# Patient Record
Sex: FEMALE | Race: WHITE | NOT HISPANIC OR LATINO | Employment: UNEMPLOYED | URBAN - METROPOLITAN AREA
[De-identification: names, ages, dates, MRNs, and addresses within clinical notes are randomized per-mention and may not be internally consistent; named-entity substitution may affect disease eponyms.]

---

## 2020-06-24 NOTE — PATIENT DISCUSSION
Spoke with patient about lenses having a limit, and that tears, cornea, retina, nerve, and brain all are part of vision.

## 2021-06-08 ENCOUNTER — NEW PATIENT COMPREHENSIVE (OUTPATIENT)
Dept: URBAN - METROPOLITAN AREA CLINIC 43 | Facility: CLINIC | Age: 61
End: 2021-06-08

## 2021-06-08 DIAGNOSIS — H33.191: ICD-10-CM

## 2021-06-08 DIAGNOSIS — H33.192: ICD-10-CM

## 2021-06-08 PROCEDURE — 92015 DETERMINE REFRACTIVE STATE: CPT

## 2021-06-08 PROCEDURE — 92004 COMPRE OPH EXAM NEW PT 1/>: CPT

## 2021-06-08 ASSESSMENT — VISUAL ACUITY
OS_SC: 20/30
OD_CC: 20/20
OS_CC: 20/20
OS_CC: J1
OU_SC: 20/30
OU_CC: 20/20
OD_CC: J2
OD_SC: 20/40
OU_CC: J1

## 2021-06-08 ASSESSMENT — TONOMETRY
OS_IOP_MMHG: 17
OD_IOP_MMHG: 18

## 2021-06-15 ENCOUNTER — RETINA CONSULT (OUTPATIENT)
Dept: URBAN - METROPOLITAN AREA CLINIC 46 | Facility: CLINIC | Age: 61
End: 2021-06-15

## 2021-06-15 DIAGNOSIS — H35.033: ICD-10-CM

## 2021-06-15 DIAGNOSIS — H33.191: ICD-10-CM

## 2021-06-15 DIAGNOSIS — H33.192: ICD-10-CM

## 2021-06-15 PROCEDURE — 92250 FUNDUS PHOTOGRAPHY W/I&R: CPT

## 2021-06-15 PROCEDURE — 99214 OFFICE O/P EST MOD 30 MIN: CPT

## 2021-06-15 ASSESSMENT — TONOMETRY
OS_IOP_MMHG: 17
OD_IOP_MMHG: 19

## 2021-06-15 ASSESSMENT — VISUAL ACUITY: OS_CC: 20/25

## 2021-07-29 ENCOUNTER — CONTACT LENS EXAM NEW (OUTPATIENT)
Dept: URBAN - METROPOLITAN AREA CLINIC 47 | Facility: CLINIC | Age: 61
End: 2021-07-29

## 2021-07-29 DIAGNOSIS — Z46.0: ICD-10-CM

## 2021-07-29 PROCEDURE — 92015GRNC REFRACTION GLASSES RECHECK - NO CHARGE

## 2021-07-29 ASSESSMENT — VISUAL ACUITY
OD_SC: 20/60
OU_CC: 20/20-
OU_CC: J1
OS_SC: 20/40

## 2021-11-15 ENCOUNTER — ESTABLISHED PATIENT (OUTPATIENT)
Dept: URBAN - METROPOLITAN AREA CLINIC 47 | Facility: CLINIC | Age: 61
End: 2021-11-15

## 2021-11-15 DIAGNOSIS — H35.033: ICD-10-CM

## 2021-11-15 DIAGNOSIS — H35.09: ICD-10-CM

## 2021-11-15 DIAGNOSIS — H33.192: ICD-10-CM

## 2021-11-15 DIAGNOSIS — H33.191: ICD-10-CM

## 2021-11-15 DIAGNOSIS — H52.7: ICD-10-CM

## 2021-11-15 PROCEDURE — 92015 DETERMINE REFRACTIVE STATE: CPT

## 2021-11-15 PROCEDURE — 92014 COMPRE OPH EXAM EST PT 1/>: CPT

## 2021-11-15 ASSESSMENT — VISUAL ACUITY
OS_CC: J1
OS_SC: J10
OD_CC: J1
OD_SC: 20/60-2
OS_CC: 20/20
OS_SC: 20/50-2
OD_SC: J8
OD_CC: 20/20

## 2021-11-15 ASSESSMENT — TONOMETRY
OS_IOP_MMHG: 17
OD_IOP_MMHG: 20

## 2022-01-10 ENCOUNTER — EMERGENCY VISIT (OUTPATIENT)
Dept: URBAN - METROPOLITAN AREA CLINIC 47 | Facility: CLINIC | Age: 62
End: 2022-01-10

## 2022-01-10 DIAGNOSIS — H02.88A: ICD-10-CM

## 2022-01-10 DIAGNOSIS — H02.88B: ICD-10-CM

## 2022-01-10 DIAGNOSIS — H00.014: ICD-10-CM

## 2022-01-10 PROCEDURE — 92012 INTRM OPH EXAM EST PATIENT: CPT

## 2022-01-10 RX ORDER — AZITHROMYCIN DIHYDRATE 250 MG/1: 1 TABLET, FILM COATED ORAL ONCE A DAY

## 2022-01-10 ASSESSMENT — VISUAL ACUITY
OS_CC: 20/20
OD_CC: 20/20

## 2022-02-08 ENCOUNTER — CONTACT LENSES/GLASSES VISIT (OUTPATIENT)
Dept: URBAN - METROPOLITAN AREA CLINIC 47 | Facility: CLINIC | Age: 62
End: 2022-02-08

## 2022-02-08 DIAGNOSIS — H00.014: ICD-10-CM

## 2022-02-08 PROCEDURE — 92012 INTRM OPH EXAM EST PATIENT: CPT

## 2022-02-08 RX ORDER — AZITHROMYCIN DIHYDRATE 250 MG/1: 1 TABLET, FILM COATED ORAL ONCE A DAY

## 2022-02-08 ASSESSMENT — VISUAL ACUITY
OS_CC: 20/20
OD_CC: 20/20

## 2022-03-07 ENCOUNTER — EMERGENCY VISIT (OUTPATIENT)
Dept: URBAN - METROPOLITAN AREA CLINIC 47 | Facility: CLINIC | Age: 62
End: 2022-03-07

## 2022-03-07 DIAGNOSIS — H01.021: ICD-10-CM

## 2022-03-07 DIAGNOSIS — H01.024: ICD-10-CM

## 2022-03-07 DIAGNOSIS — H35.033: ICD-10-CM

## 2022-03-07 DIAGNOSIS — H33.191: ICD-10-CM

## 2022-03-07 DIAGNOSIS — H33.192: ICD-10-CM

## 2022-03-07 DIAGNOSIS — H35.09: ICD-10-CM

## 2022-03-07 DIAGNOSIS — H02.88B: ICD-10-CM

## 2022-03-07 DIAGNOSIS — H00.14: ICD-10-CM

## 2022-03-07 DIAGNOSIS — H02.88A: ICD-10-CM

## 2022-03-07 PROCEDURE — 92012 INTRM OPH EXAM EST PATIENT: CPT

## 2022-03-07 PROCEDURE — 99199RSP RESIDENT SUPERVISED BY PROVIDER

## 2022-03-07 ASSESSMENT — VISUAL ACUITY
OD_CC: 20/20
OS_CC: 20/20

## 2022-03-07 ASSESSMENT — TONOMETRY
OD_IOP_MMHG: 18
OS_IOP_MMHG: 18

## 2022-11-16 ENCOUNTER — COMPREHENSIVE EXAM (OUTPATIENT)
Dept: URBAN - METROPOLITAN AREA CLINIC 47 | Facility: CLINIC | Age: 62
End: 2022-11-16

## 2022-11-16 DIAGNOSIS — H35.09: ICD-10-CM

## 2022-11-16 DIAGNOSIS — H01.021: ICD-10-CM

## 2022-11-16 DIAGNOSIS — H33.191: ICD-10-CM

## 2022-11-16 DIAGNOSIS — H33.192: ICD-10-CM

## 2022-11-16 DIAGNOSIS — H02.88A: ICD-10-CM

## 2022-11-16 DIAGNOSIS — H02.88B: ICD-10-CM

## 2022-11-16 DIAGNOSIS — H35.033: ICD-10-CM

## 2022-11-16 DIAGNOSIS — H52.7: ICD-10-CM

## 2022-11-16 DIAGNOSIS — H01.024: ICD-10-CM

## 2022-11-16 PROCEDURE — 92015 DETERMINE REFRACTIVE STATE: CPT

## 2022-11-16 PROCEDURE — 92014 COMPRE OPH EXAM EST PT 1/>: CPT

## 2022-11-16 ASSESSMENT — TONOMETRY
OD_IOP_MMHG: 18
OS_IOP_MMHG: 18

## 2022-11-16 ASSESSMENT — VISUAL ACUITY
OD_CC: J1-
OS_CC: J2
OS_CC: 20/20-1
OS_SC: 20/40-1
OD_SC: <J12
OD_SC: 20/40-
OD_CC: 20/20-
OS_SC: <J12

## 2023-11-20 ENCOUNTER — COMPREHENSIVE EXAM (OUTPATIENT)
Dept: URBAN - METROPOLITAN AREA CLINIC 47 | Facility: CLINIC | Age: 63
End: 2023-11-20

## 2023-11-20 DIAGNOSIS — H35.09: ICD-10-CM

## 2023-11-20 DIAGNOSIS — H02.88A: ICD-10-CM

## 2023-11-20 DIAGNOSIS — H35.033: ICD-10-CM

## 2023-11-20 DIAGNOSIS — H33.191: ICD-10-CM

## 2023-11-20 DIAGNOSIS — H01.021: ICD-10-CM

## 2023-11-20 DIAGNOSIS — H52.7: ICD-10-CM

## 2023-11-20 DIAGNOSIS — H01.024: ICD-10-CM

## 2023-11-20 DIAGNOSIS — H02.88B: ICD-10-CM

## 2023-11-20 DIAGNOSIS — H40.053: ICD-10-CM

## 2023-11-20 DIAGNOSIS — H33.192: ICD-10-CM

## 2023-11-20 PROCEDURE — 92014 COMPRE OPH EXAM EST PT 1/>: CPT

## 2023-11-20 PROCEDURE — 92015 DETERMINE REFRACTIVE STATE: CPT

## 2023-11-20 PROCEDURE — 92250 FUNDUS PHOTOGRAPHY W/I&R: CPT

## 2023-11-20 ASSESSMENT — VISUAL ACUITY
OD_BAT: 20/20
OS_CC: 20/25
OD_CC: J3
OU_CC: J1
OD_CC: 20/30
OS_CC: J3
OU_CC: 20/20
OS_BAT: 20/20

## 2023-11-20 ASSESSMENT — TONOMETRY
OS_IOP_MMHG: 22
OD_IOP_MMHG: 27
OD_IOP_MMHG: 24
OS_IOP_MMHG: 23

## 2023-12-18 ENCOUNTER — FOLLOW UP (OUTPATIENT)
Dept: URBAN - METROPOLITAN AREA CLINIC 47 | Facility: CLINIC | Age: 63
End: 2023-12-18

## 2023-12-18 DIAGNOSIS — H40.053: ICD-10-CM

## 2023-12-18 PROCEDURE — 92133 CPTRZD OPH DX IMG PST SGM ON: CPT

## 2023-12-18 PROCEDURE — 99213 OFFICE O/P EST LOW 20 MIN: CPT

## 2023-12-18 PROCEDURE — 76514 ECHO EXAM OF EYE THICKNESS: CPT

## 2023-12-18 PROCEDURE — 92083 EXTENDED VISUAL FIELD XM: CPT

## 2023-12-18 ASSESSMENT — TONOMETRY
OS_IOP_MMHG: 20
OD_IOP_MMHG: 22

## 2023-12-18 ASSESSMENT — VISUAL ACUITY
OD_CC: 20/20
OS_CC: 20/20

## 2024-02-22 ENCOUNTER — FOLLOW UP (OUTPATIENT)
Dept: URBAN - METROPOLITAN AREA CLINIC 47 | Facility: CLINIC | Age: 64
End: 2024-02-22

## 2024-02-22 DIAGNOSIS — H02.88A: ICD-10-CM

## 2024-02-22 DIAGNOSIS — H01.024: ICD-10-CM

## 2024-02-22 DIAGNOSIS — H40.053: ICD-10-CM

## 2024-02-22 DIAGNOSIS — H04.123: ICD-10-CM

## 2024-02-22 DIAGNOSIS — H02.88B: ICD-10-CM

## 2024-02-22 DIAGNOSIS — H01.021: ICD-10-CM

## 2024-02-22 PROCEDURE — 99214 OFFICE O/P EST MOD 30 MIN: CPT

## 2024-02-22 ASSESSMENT — VISUAL ACUITY
OS_CC: 20/20
OD_CC: 20/20
OU_CC: 20/20

## 2024-02-22 ASSESSMENT — TONOMETRY
OD_IOP_MMHG: 19
OS_IOP_MMHG: 18

## 2024-06-27 ENCOUNTER — FOLLOW UP (OUTPATIENT)
Dept: URBAN - METROPOLITAN AREA CLINIC 47 | Facility: CLINIC | Age: 64
End: 2024-06-27

## 2024-06-27 DIAGNOSIS — H40.053: ICD-10-CM

## 2024-06-27 DIAGNOSIS — H02.88B: ICD-10-CM

## 2024-06-27 DIAGNOSIS — H02.88A: ICD-10-CM

## 2024-06-27 DIAGNOSIS — H04.123: ICD-10-CM

## 2024-06-27 DIAGNOSIS — H01.024: ICD-10-CM

## 2024-06-27 DIAGNOSIS — H01.021: ICD-10-CM

## 2024-06-27 PROCEDURE — 99213 OFFICE O/P EST LOW 20 MIN: CPT | Mod: 25

## 2024-06-27 PROCEDURE — 68761C PUNCTUM PLUG /COLLAGEN, EACH: Mod: E2,E4

## 2024-06-27 PROCEDURE — A4262 TEMPORARY TEAR DUCT PLUG: HCPCS

## 2024-06-27 RX ORDER — CYCLOSPORINE 0.5 MG/ML: 1 EMULSION OPHTHALMIC TWICE A DAY

## 2024-06-27 ASSESSMENT — TONOMETRY
OD_IOP_MMHG: 18
OS_IOP_MMHG: 18
OS_IOP_MMHG: 20
OD_IOP_MMHG: 20

## 2024-06-27 ASSESSMENT — VISUAL ACUITY: OD_CC: 20/20

## 2024-09-30 ENCOUNTER — FOLLOW UP (OUTPATIENT)
Dept: URBAN - METROPOLITAN AREA CLINIC 47 | Facility: CLINIC | Age: 64
End: 2024-09-30

## 2024-09-30 DIAGNOSIS — H01.021: ICD-10-CM

## 2024-09-30 DIAGNOSIS — H02.88A: ICD-10-CM

## 2024-09-30 DIAGNOSIS — H04.123: ICD-10-CM

## 2024-09-30 DIAGNOSIS — H40.053: ICD-10-CM

## 2024-09-30 DIAGNOSIS — H02.88B: ICD-10-CM

## 2024-09-30 DIAGNOSIS — H01.024: ICD-10-CM

## 2024-09-30 PROCEDURE — 92250 FUNDUS PHOTOGRAPHY W/I&R: CPT

## 2024-09-30 PROCEDURE — 99213 OFFICE O/P EST LOW 20 MIN: CPT

## 2024-09-30 PROCEDURE — 99199RSP RESIDENT SUPERVISED BY PROVIDER

## 2024-12-31 ENCOUNTER — COMPREHENSIVE EXAM (OUTPATIENT)
Age: 64
End: 2024-12-31

## 2024-12-31 DIAGNOSIS — H02.88B: ICD-10-CM

## 2024-12-31 DIAGNOSIS — H52.7: ICD-10-CM

## 2024-12-31 DIAGNOSIS — H01.021: ICD-10-CM

## 2024-12-31 DIAGNOSIS — H02.88A: ICD-10-CM

## 2024-12-31 DIAGNOSIS — H33.191: ICD-10-CM

## 2024-12-31 DIAGNOSIS — H40.053: ICD-10-CM

## 2024-12-31 DIAGNOSIS — H01.024: ICD-10-CM

## 2024-12-31 DIAGNOSIS — H04.123: ICD-10-CM

## 2024-12-31 DIAGNOSIS — H33.192: ICD-10-CM

## 2024-12-31 PROCEDURE — 92015 DETERMINE REFRACTIVE STATE: CPT

## 2024-12-31 PROCEDURE — 99214 OFFICE O/P EST MOD 30 MIN: CPT

## 2025-06-27 ENCOUNTER — FOLLOW UP (OUTPATIENT)
Age: 65
End: 2025-06-27

## 2025-06-27 DIAGNOSIS — H01.021: ICD-10-CM

## 2025-06-27 DIAGNOSIS — H04.123: ICD-10-CM

## 2025-06-27 DIAGNOSIS — H01.024: ICD-10-CM

## 2025-06-27 DIAGNOSIS — H02.88B: ICD-10-CM

## 2025-06-27 DIAGNOSIS — H02.88A: ICD-10-CM

## 2025-06-27 DIAGNOSIS — H40.053: ICD-10-CM

## 2025-06-27 PROCEDURE — 99213 OFFICE O/P EST LOW 20 MIN: CPT

## 2025-06-27 PROCEDURE — 92133 CPTRZD OPH DX IMG PST SGM ON: CPT

## 2025-06-27 RX ORDER — PERFLUOROHEXYLOCTANE 1 MG/MG: 1 SOLUTION OPHTHALMIC

## 2025-07-29 ENCOUNTER — EMERGENCY VISIT (OUTPATIENT)
Age: 65
End: 2025-07-29

## 2025-07-29 DIAGNOSIS — H04.123: ICD-10-CM

## 2025-07-29 DIAGNOSIS — H02.88A: ICD-10-CM

## 2025-07-29 DIAGNOSIS — B00.59: ICD-10-CM

## 2025-07-29 DIAGNOSIS — H02.88B: ICD-10-CM

## 2025-07-29 PROCEDURE — 99214 OFFICE O/P EST MOD 30 MIN: CPT

## 2025-07-29 RX ORDER — VALACYCLOVIR 1 G/1: TABLET, FILM COATED ORAL TWICE A DAY

## 2025-08-11 ENCOUNTER — FOLLOW UP (OUTPATIENT)
Age: 65
End: 2025-08-11

## 2025-08-11 DIAGNOSIS — H01.112: ICD-10-CM

## 2025-08-11 DIAGNOSIS — H02.88A: ICD-10-CM

## 2025-08-11 DIAGNOSIS — H40.053: ICD-10-CM

## 2025-08-11 DIAGNOSIS — H02.88B: ICD-10-CM

## 2025-08-11 DIAGNOSIS — H04.123: ICD-10-CM

## 2025-08-11 PROCEDURE — 68761C PUNCTUM PLUG /COLLAGEN, EACH

## 2025-08-11 PROCEDURE — A4262 TEMPORARY TEAR DUCT PLUG: HCPCS | Mod: E2,E4

## 2025-08-11 PROCEDURE — 99213 OFFICE O/P EST LOW 20 MIN: CPT | Mod: 25
